# Patient Record
Sex: FEMALE | Race: WHITE | NOT HISPANIC OR LATINO | Employment: FULL TIME | URBAN - METROPOLITAN AREA
[De-identification: names, ages, dates, MRNs, and addresses within clinical notes are randomized per-mention and may not be internally consistent; named-entity substitution may affect disease eponyms.]

---

## 2019-05-23 ENCOUNTER — TELEPHONE (OUTPATIENT)
Dept: OBSTETRICS AND GYNECOLOGY | Facility: HOSPITAL | Age: 34
End: 2019-05-23

## 2019-05-23 ENCOUNTER — HOSPITAL ENCOUNTER (EMERGENCY)
Facility: HOSPITAL | Age: 34
Discharge: HOME | End: 2019-05-23
Attending: EMERGENCY MEDICINE
Payer: MEDICAID

## 2019-05-23 VITALS
SYSTOLIC BLOOD PRESSURE: 114 MMHG | DIASTOLIC BLOOD PRESSURE: 54 MMHG | HEART RATE: 85 BPM | WEIGHT: 280 LBS | TEMPERATURE: 98 F | BODY MASS INDEX: 49.61 KG/M2 | HEIGHT: 63 IN | RESPIRATION RATE: 16 BRPM | OXYGEN SATURATION: 100 %

## 2019-05-23 DIAGNOSIS — Z32.01 POSITIVE PREGNANCY TEST: ICD-10-CM

## 2019-05-23 DIAGNOSIS — N83.202 LEFT OVARIAN CYST: ICD-10-CM

## 2019-05-23 DIAGNOSIS — N23 RENAL COLIC ON LEFT SIDE: Primary | ICD-10-CM

## 2019-05-23 DIAGNOSIS — O36.80X0 PREGNANCY OF UNKNOWN ANATOMIC LOCATION: Primary | ICD-10-CM

## 2019-05-23 DIAGNOSIS — R10.9 LEFT FLANK PAIN: ICD-10-CM

## 2019-05-23 LAB
ABO + RH BLD: NORMAL
ALBUMIN SERPL BCP-MCNC: 3.5 G/DL (ref 3.5–5.2)
ALP SERPL-CCNC: 83 U/L (ref 55–135)
ALT SERPL W/O P-5'-P-CCNC: 19 U/L (ref 10–44)
ANION GAP SERPL CALC-SCNC: 12 MMOL/L (ref 8–16)
AST SERPL-CCNC: 19 U/L (ref 10–40)
B-HCG UR QL: POSITIVE
BACTERIA #/AREA URNS AUTO: ABNORMAL /HPF
BACTERIA #/AREA URNS AUTO: ABNORMAL /HPF
BACTERIA GENITAL QL WET PREP: ABNORMAL
BASOPHILS # BLD AUTO: 0.09 K/UL (ref 0–0.2)
BASOPHILS NFR BLD: 1 % (ref 0–1.9)
BILIRUB DIRECT SERPL-MCNC: 0.3 MG/DL (ref 0.1–0.3)
BILIRUB SERPL-MCNC: 0.4 MG/DL (ref 0.1–1)
BILIRUB UR QL STRIP: NEGATIVE
BILIRUB UR QL STRIP: NEGATIVE
BLD GP AB SCN CELLS X3 SERPL QL: NORMAL
BUN SERPL-MCNC: 14 MG/DL (ref 6–20)
C TRACH DNA SPEC QL NAA+PROBE: NOT DETECTED
CALCIUM SERPL-MCNC: 9.5 MG/DL (ref 8.7–10.5)
CAOX CRY UR QL COMP ASSIST: ABNORMAL
CHLORIDE SERPL-SCNC: 108 MMOL/L (ref 95–110)
CLARITY UR REFRACT.AUTO: ABNORMAL
CLARITY UR REFRACT.AUTO: CLEAR
CLUE CELLS VAG QL WET PREP: ABNORMAL
CO2 SERPL-SCNC: 15 MMOL/L (ref 23–29)
COLOR UR AUTO: ABNORMAL
COLOR UR AUTO: YELLOW
CREAT SERPL-MCNC: 0.9 MG/DL (ref 0.5–1.4)
CTP QC/QA: YES
DIFFERENTIAL METHOD: ABNORMAL
EOSINOPHIL # BLD AUTO: 0.2 K/UL (ref 0–0.5)
EOSINOPHIL NFR BLD: 1.7 % (ref 0–8)
ERYTHROCYTE [DISTWIDTH] IN BLOOD BY AUTOMATED COUNT: 12.2 % (ref 11.5–14.5)
EST. GFR  (AFRICAN AMERICAN): >60 ML/MIN/1.73 M^2
EST. GFR  (NON AFRICAN AMERICAN): >60 ML/MIN/1.73 M^2
FILAMENT FUNGI VAG WET PREP-#/AREA: ABNORMAL
GLUCOSE SERPL-MCNC: 113 MG/DL (ref 70–110)
GLUCOSE UR QL STRIP: NEGATIVE
GLUCOSE UR QL STRIP: NEGATIVE
HCG INTACT+B SERPL-ACNC: 49 MIU/ML
HCT VFR BLD AUTO: 41.2 % (ref 37–48.5)
HGB BLD-MCNC: 13.8 G/DL (ref 12–16)
HGB UR QL STRIP: ABNORMAL
HGB UR QL STRIP: ABNORMAL
HYALINE CASTS UR QL AUTO: 0 /LPF
HYALINE CASTS UR QL AUTO: 5 /LPF
IMM GRANULOCYTES # BLD AUTO: 0.08 K/UL (ref 0–0.04)
IMM GRANULOCYTES NFR BLD AUTO: 0.9 % (ref 0–0.5)
KETONES UR QL STRIP: ABNORMAL
KETONES UR QL STRIP: NEGATIVE
LEUKOCYTE ESTERASE UR QL STRIP: ABNORMAL
LEUKOCYTE ESTERASE UR QL STRIP: NEGATIVE
LYMPHOCYTES # BLD AUTO: 2.6 K/UL (ref 1–4.8)
LYMPHOCYTES NFR BLD: 30.1 % (ref 18–48)
MCH RBC QN AUTO: 27.8 PG (ref 27–31)
MCHC RBC AUTO-ENTMCNC: 33.5 G/DL (ref 32–36)
MCV RBC AUTO: 83 FL (ref 82–98)
MICROSCOPIC COMMENT: ABNORMAL
MICROSCOPIC COMMENT: ABNORMAL
MONOCYTES # BLD AUTO: 0.8 K/UL (ref 0.3–1)
MONOCYTES NFR BLD: 9 % (ref 4–15)
N GONORRHOEA DNA SPEC QL NAA+PROBE: NOT DETECTED
NEUTROPHILS # BLD AUTO: 5 K/UL (ref 1.8–7.7)
NEUTROPHILS NFR BLD: 57.3 % (ref 38–73)
NITRITE UR QL STRIP: NEGATIVE
NITRITE UR QL STRIP: NEGATIVE
NRBC BLD-RTO: 0 /100 WBC
PH UR STRIP: 5 [PH] (ref 5–8)
PH UR STRIP: 6 [PH] (ref 5–8)
PLATELET # BLD AUTO: 291 K/UL (ref 150–350)
PMV BLD AUTO: 8.9 FL (ref 9.2–12.9)
POTASSIUM SERPL-SCNC: 4.6 MMOL/L (ref 3.5–5.1)
PROT SERPL-MCNC: 7.5 G/DL (ref 6–8.4)
PROT UR QL STRIP: ABNORMAL
PROT UR QL STRIP: NEGATIVE
RBC # BLD AUTO: 4.97 M/UL (ref 4–5.4)
RBC #/AREA URNS AUTO: 12 /HPF (ref 0–4)
RBC #/AREA URNS AUTO: 5 /HPF (ref 0–4)
SODIUM SERPL-SCNC: 135 MMOL/L (ref 136–145)
SP GR UR STRIP: 1.01 (ref 1–1.03)
SP GR UR STRIP: >=1.03 (ref 1–1.03)
SPECIMEN SOURCE: ABNORMAL
SQUAMOUS #/AREA URNS AUTO: 3 /HPF
SQUAMOUS #/AREA URNS AUTO: 43 /HPF
T VAGINALIS GENITAL QL WET PREP: ABNORMAL
URN SPEC COLLECT METH UR: ABNORMAL
URN SPEC COLLECT METH UR: ABNORMAL
WBC # BLD AUTO: 8.71 K/UL (ref 3.9–12.7)
WBC #/AREA URNS AUTO: 2 /HPF (ref 0–5)
WBC #/AREA URNS AUTO: 43 /HPF (ref 0–5)
WBC #/AREA VAG WET PREP: ABNORMAL
YEAST GENITAL QL WET PREP: ABNORMAL

## 2019-05-23 PROCEDURE — 87086 URINE CULTURE/COLONY COUNT: CPT

## 2019-05-23 PROCEDURE — 96375 TX/PRO/DX INJ NEW DRUG ADDON: CPT | Mod: 59

## 2019-05-23 PROCEDURE — 99285 PR EMERGENCY DEPT VISIT,LEVEL V: ICD-10-PCS | Mod: ,,, | Performed by: PHYSICIAN ASSISTANT

## 2019-05-23 PROCEDURE — 96376 TX/PRO/DX INJ SAME DRUG ADON: CPT | Mod: 59

## 2019-05-23 PROCEDURE — 86850 RBC ANTIBODY SCREEN: CPT

## 2019-05-23 PROCEDURE — 99285 EMERGENCY DEPT VISIT HI MDM: CPT | Mod: ,,, | Performed by: PHYSICIAN ASSISTANT

## 2019-05-23 PROCEDURE — 87491 CHLMYD TRACH DNA AMP PROBE: CPT

## 2019-05-23 PROCEDURE — 96361 HYDRATE IV INFUSION ADD-ON: CPT | Mod: 59

## 2019-05-23 PROCEDURE — 63600175 PHARM REV CODE 636 W HCPCS: Performed by: EMERGENCY MEDICINE

## 2019-05-23 PROCEDURE — 81025 URINE PREGNANCY TEST: CPT | Performed by: EMERGENCY MEDICINE

## 2019-05-23 PROCEDURE — 25000003 PHARM REV CODE 250: Performed by: PHYSICIAN ASSISTANT

## 2019-05-23 PROCEDURE — 99285 EMERGENCY DEPT VISIT HI MDM: CPT | Mod: 25

## 2019-05-23 PROCEDURE — 80076 HEPATIC FUNCTION PANEL: CPT

## 2019-05-23 PROCEDURE — 80048 BASIC METABOLIC PNL TOTAL CA: CPT

## 2019-05-23 PROCEDURE — 84702 CHORIONIC GONADOTROPIN TEST: CPT

## 2019-05-23 PROCEDURE — 96374 THER/PROPH/DIAG INJ IV PUSH: CPT | Mod: 59

## 2019-05-23 PROCEDURE — 63600175 PHARM REV CODE 636 W HCPCS: Performed by: PHYSICIAN ASSISTANT

## 2019-05-23 PROCEDURE — P9612 CATHETERIZE FOR URINE SPEC: HCPCS

## 2019-05-23 PROCEDURE — 85025 COMPLETE CBC W/AUTO DIFF WBC: CPT

## 2019-05-23 PROCEDURE — 81001 URINALYSIS AUTO W/SCOPE: CPT | Mod: 91

## 2019-05-23 PROCEDURE — 87210 SMEAR WET MOUNT SALINE/INK: CPT

## 2019-05-23 RX ORDER — ONDANSETRON 2 MG/ML
4 INJECTION INTRAMUSCULAR; INTRAVENOUS
Status: COMPLETED | OUTPATIENT
Start: 2019-05-23 | End: 2019-05-23

## 2019-05-23 RX ORDER — HYDROCODONE BITARTRATE AND ACETAMINOPHEN 5; 325 MG/1; MG/1
1 TABLET ORAL EVERY 6 HOURS PRN
Qty: 12 TABLET | Refills: 0 | Status: SHIPPED | OUTPATIENT
Start: 2019-05-23

## 2019-05-23 RX ORDER — MORPHINE SULFATE 4 MG/ML
4 INJECTION, SOLUTION INTRAMUSCULAR; INTRAVENOUS
Status: COMPLETED | OUTPATIENT
Start: 2019-05-23 | End: 2019-05-23

## 2019-05-23 RX ORDER — KETOROLAC TROMETHAMINE 30 MG/ML
10 INJECTION, SOLUTION INTRAMUSCULAR; INTRAVENOUS
Status: DISCONTINUED | OUTPATIENT
Start: 2019-05-23 | End: 2019-05-23

## 2019-05-23 RX ORDER — PYRIDOXINE HCL (VITAMIN B6) 25 MG
25 TABLET ORAL DAILY
Qty: 30 TABLET | Refills: 0 | Status: SHIPPED | OUTPATIENT
Start: 2019-05-23

## 2019-05-23 RX ADMIN — MORPHINE SULFATE 4 MG: 4 INJECTION INTRAVENOUS at 06:05

## 2019-05-23 RX ADMIN — SODIUM CHLORIDE 1000 ML: 0.9 INJECTION, SOLUTION INTRAVENOUS at 06:05

## 2019-05-23 RX ADMIN — ONDANSETRON 4 MG: 2 INJECTION INTRAMUSCULAR; INTRAVENOUS at 10:05

## 2019-05-23 RX ADMIN — MORPHINE SULFATE 4 MG: 4 INJECTION INTRAVENOUS at 05:05

## 2019-05-23 RX ADMIN — ONDANSETRON 4 MG: 2 INJECTION INTRAMUSCULAR; INTRAVENOUS at 05:05

## 2019-05-23 NOTE — ED NOTES
Patient identifiers verified verbally with patient and correct for Dori Peterson.    LOC/ APPEARANCE: The patient is AAOx4. Pt is speaking appropriately, no slurred speech. Pt remains in hospital gown. Continuous and auto BP cuff applied to patient. Pt is clean and well groomed. No JVD visible. Pt reports pain level of 0 at this time. Pt updated on POC. Bed low and locked with side rails up x2, call bell in pt reach.  SKIN: Skin is warm dry and intact, and color is consistent with ethnicity. Capillary refill <3 seconds. No breakdown or brusing visible. Mucus membranes moist, acyanotic.  RESPIRATORY: Airway is open and patent. Respirations-spontaneous, unlabored, regular rate, equal bilaterally on inspiration and expiration. No accessory muscle use noted. Lungs clear to auscultation in all fields bilaterally anterior and posterior.   CARDIAC: Patient has regular heart rate. Patient has no c/o chest pain. Peripheral pulses present equal and strong throughout.  ABDOMEN: Soft, mild tenderness noted to lower abdomen, with no distention noted.  NEUROLOGIC: Eyes open spontaneously and facial expression symmetrical. Pt behavior appropriate to situation, and pt follows commands. Pt reports sensation present in all extremities when touched with a finger, denies any numbness or tingling. PERRLA  MUSCULOSKELETAL: Spontaneous movement noted to all extremities. Hand  equal and leg strength strong +5 bilaterally.

## 2019-05-23 NOTE — ED PROVIDER NOTES
Encounter Date: 5/23/2019    SCRIBE #1 NOTE: IYomi, am scribing for, and in the presence of, Dr. MONY Isaac. Other sections scribed: Attending notes.       History     Chief Complaint   Patient presents with    Flank Pain     pt complaining of left flank pain that radiates to left groin area. pt states she was just diagnosed with bladder infection and UTI     33-year-old female presents to the ER for evaluation of flank pain. Patient reports being diagnosed with a kidney stone 6 weeks ago that she never passed on the left side.  She does not recall the location of the stone or the size of the stone.  Last night the patient was diagnosed with the UTI and started on a penicillin medication.  This was all performed in her hometown, in [facility].  The patient is a new ones on vacation.  Tonight around 3:00 a.m. she had acute onset of severe left-sided flank pain with nausea and vomiting.  She reports the pain being similar to that of when she had kidney stones.  She denies any fevers chills or chest pain. She appears moderately uncomfortable and colicky.         Review of patient's allergies indicates:  No Known Allergies  History reviewed. No pertinent past medical history.  History reviewed. No pertinent surgical history.  History reviewed. No pertinent family history.  Social History     Tobacco Use    Smoking status: Never Smoker   Substance Use Topics    Alcohol use: Not Currently     Frequency: Never    Drug use: Never     Review of Systems   Constitutional: Negative for fever.   HENT: Negative for sore throat.    Respiratory: Negative for shortness of breath.    Cardiovascular: Negative for chest pain.   Gastrointestinal: Positive for abdominal pain, nausea and vomiting.   Genitourinary: Positive for urgency. Negative for dysuria.   Musculoskeletal: Negative for back pain.   Skin: Negative for rash.   Neurological: Negative for weakness.   Hematological: Does not bruise/bleed easily.        Physical Exam     Initial Vitals [05/23/19 0510]   BP Pulse Resp Temp SpO2   130/69 99 20 97.6 °F (36.4 °C) 98 %      MAP       --         Physical Exam    Constitutional: Vital signs are normal. She appears well-developed and well-nourished. She is not diaphoretic. She appears distressed.   HENT:   Head: Normocephalic and atraumatic.   Right Ear: External ear normal.   Left Ear: External ear normal.   Eyes: Conjunctivae are normal.   Cardiovascular: Normal rate and regular rhythm.   Abdominal: Soft. Normal appearance and bowel sounds are normal.   Patient has abdominal pain which she is nontender on exam  No CVA tenderness   Musculoskeletal: Normal range of motion.   Neurological: She is alert and oriented to person, place, and time.   Skin: Skin is warm and intact.   Psychiatric: She has a normal mood and affect. Her speech is normal and behavior is normal. Cognition and memory are normal.         ED Course   Procedures  Labs Reviewed   URINALYSIS, REFLEX TO URINE CULTURE - Abnormal; Notable for the following components:       Result Value    Appearance, UA Cloudy (*)     Specific Gravity, UA >=1.030 (*)     Protein, UA 1+ (*)     Occult Blood UA 2+ (*)     Leukocytes, UA 2+ (*)     All other components within normal limits    Narrative:     Preferred Collection Type->Urine, Clean Catch   CBC W/ AUTO DIFFERENTIAL - Abnormal; Notable for the following components:    MPV 8.9 (*)     Immature Granulocytes 0.9 (*)     Immature Grans (Abs) 0.08 (*)     All other components within normal limits   BASIC METABOLIC PANEL - Abnormal; Notable for the following components:    Sodium 135 (*)     CO2 15 (*)     Glucose 113 (*)     All other components within normal limits   URINALYSIS MICROSCOPIC - Abnormal; Notable for the following components:    RBC, UA 12 (*)     WBC, UA 43 (*)     Bacteria Many (*)     All other components within normal limits    Narrative:     Preferred Collection Type->Urine, Clean Catch    URINALYSIS, REFLEX TO URINE CULTURE - Abnormal; Notable for the following components:    Ketones, UA Trace (*)     Occult Blood UA 2+ (*)     All other components within normal limits    Narrative:     Preferred Collection Type->Urine, Catheterized   URINALYSIS MICROSCOPIC - Abnormal; Notable for the following components:    RBC, UA 5 (*)     Hyaline Casts, UA 5 (*)     All other components within normal limits    Narrative:     Preferred Collection Type->Urine, Catheterized   VAGINAL SCREEN - Abnormal; Notable for the following components:    WBC - Vaginal Screen Moderate (*)     Bacteria - Vaginal Screen Few (*)     All other components within normal limits   POCT URINE PREGNANCY - Abnormal; Notable for the following components:    POC Preg Test, Ur Positive (*)     All other components within normal limits   C. TRACHOMATIS/N. GONORRHOEAE BY AMP DNA    Narrative:     Resulting Location->[facility]   CULTURE, URINE   HCG, QUANTITATIVE, PREGNANCY   HEPATIC FUNCTION PANEL   HEPATIC FUNCTION PANEL    Narrative:     Add on HEPFP;[id number];[id number] Per Dr. Tsang 05/23/2019  06:34    HCG, QUANTITATIVE, PREGNANCY    Narrative:     Add on HEPFP;[id number] HCGQ;[id number] Per Dr. Tsang 05/23/2019  06:34    TYPE & SCREEN     EKG Readings: (Independently Interpreted)   Initial Reading: No STEMI.   NSR, rate 91        Imaging Results          US Retroperitoneal Complete (Final result)  Result time 05/23/19 08:32:37   Procedure changed from US Retroperitoneal Limited     Final result by Karsten Michel MD (05/23/19 08:32:37)                 Impression:      Left-sided hydronephrosis without an obstructive lesion or ureteral stone identified.  Bilateral urine jets visualized.  Consider further evaluation with CT scan.    Electronically signed by resident: Rigo Hastings  Date:    05/23/2019  Time:    08:22    Electronically signed by: Karsten Michel MD  Date:    05/23/2019  Time:    08:32             Narrative:     EXAMINATION:  US RETROPERITONEAL COMPLETE    CLINICAL HISTORY:  lt flank pain; Unspecified abdominal pain    TECHNIQUE:  Ultrasound of the kidneys and urinary bladder was performed including color flow and Doppler evaluation of the kidneys.    COMPARISON:  None.    FINDINGS:  Right kidney: The right kidney measures 11.1 cm. No cortical thinning. No loss of corticomedullary distinction. Resistive index measures 0.61.  No mass. No renal stone. No hydronephrosis.    Left kidney: The left kidney measures 12.8 cm. No cortical thinning. No loss of corticomedullary distinction. Resistive index measures 0.64.  No mass. No renal stone. Mild hydronephrosis.    Bilateral urine jets are seen in the bladder.                               US OB Less Than 14 Wks First Gestation (Final result)  Result time 05/23/19 08:34:20    Final result by Karsten Michel MD (05/23/19 08:34:20)                 Impression:      Pregnancy of unknown location and viability in this patient with a positive urine pregnancy test.  Recommend short-term follow-up and serial beta hCG are recommended to further delineate the left ovarian structure favored to represent a corpus luteum cyst.    Electronically signed by resident: Rigo Hastings  Date:    05/23/2019  Time:    08:08    Electronically signed by: Karsten Michel MD  Date:    05/23/2019  Time:    08:34             Narrative:    EXAMINATION:  US OB LESS THAN 14 WEEKS FIRST GESTATION    CLINICAL HISTORY:  Unspecified abdominal pain    Patient with positive urine pregnancy test.    TECHNIQUE:  Transabdominal sonography of the pelvis was performed, followed by transvaginal sonography to better evaluate the uterus and ovaries.    COMPARISON:  No available dedicated priors.    FINDINGS:  Uterus measures 9.9 x 5.8 x 5.9 cm.    Endometrium measures up to 1.7 cm.    Multiple hypoechoic cystic foci are identified about the cervix likely nabothian cysts..    Gestation(s): No intrauterine or extrauterine  gestation definitely identified.    Right ovary: Normal.  No ectopic pregnancy identified.    Left ovary: Normal.  Poorly defined possibly cystic appearing structure is identified within the left ovary measuring 1.6 x 1.3 x 1.3 cm.  This is indeterminate but may represent a corpus luteum cyst.  Ectopic pregnancy is not definitely excluded, and short-term follow-up is recommended.    Miscellaneous: No Free Fluid.                                 Medical Decision Making:   History:   Old Medical Records: I decided to obtain old medical records.  Initial Assessment:   33-year-old female with flank pain  Differential Diagnosis:   Kidney stone, UTI, pyelonephritis, ectopic prgnancy  Clinical Tests:   Lab Tests: Ordered and Reviewed  Radiological Study: Ordered and Reviewed  ED Management:  32 yo F with flank pain, DDX with kidney stones a few weeks ago, never passed, UTI last night. Concern tonight for infected kidney stone, possible pyelo.   Will get labs, urine, and CT  Pain control, fluids, and reassess.  Pt has a positive UPT. No vaginal complaints  CT cancelled. Will get Retroperitoneal US for eval of possible kidney stone, concern for infected stone and Pelvic US for eval of possible Ectopic  FAST exam negative in the ED  Initial Urinalysis with numerous squamous cells, will get cath specimen  Will sign out pt to 6am ED team    Other:   I discussed test(s) with the performing physician.            Scribe Attestation:   Scribe #1: I performed the above scribed service and the documentation accurately describes the services I performed. I attest to the accuracy of the note.    Attending Attestation:     Physician Attestation Statement for NP/PA:   I have conducted a face to face encounter with this patient in addition to the NP/PA, due to Medical Complexity    Other NP/PA Attestation Additions:    History of Present Illness: 33-year-old  presents complaining of sudden onset of left-sided abdominal pain that  awoke her from sleep around 2:00 a.m..  She reports that 6 weeks ago she was diagnosed with a kidney stone on the left by CT scan.  She had pain for 1 day, strain her urine for 2 weeks.  Has had no pain since and never saw a kidney stone in her urine.  Two nights ago she was seen at an outside emergency department with spasm in her vaginal area.  She felt that it was spasm in her urethra and she was diagnosed with the UTI.  This pain resolved last night prior to going to bed.  Then the pain woke her from sleep in her left abdomen.  I assumed care of this patient from the PA who was caring for her during the night last night.  She had gone to ultrasound at the onset of my shift and currently states her pain is resolved.   Physical Exam: Abdomen is soft with left-sided abdominal tenderness.  Pelvic exam:  There is whitish/yellowish vaginal discharge.  No cervical motion tenderness.  Mild left adnexal tenderness however with bimanual exam patient reports a feeling of bladder spasm.   Medical Decision Making: This is a patient who presents with a positive UPT, beta HCG of 49 and a possible infected stone.  She had 2 ultrasounds done, an ultrasound of her kidneys as well as a pelvic ultrasound.  Ultrasound of her left kidney shows left hydronephrosis with normal ureteral jets.  This is concerning for possible left stone.  Her initial urinalysis showed a contaminated urine specimen so it is unclear if this is an infected stone.  A cath urinalysis is pending.  Her pelvic ultrasound shows no evidence of an IUP however her HCG is very low.  It did however show a cystic structure on her left adnexa.  This could represent a left ovarian cyst however and early ectopic pregnancy cannot be ruled out.  Given that her pain was initially very colicky and severe and now has completely resolved, I suspect a passed kidney stone however I plan to discuss with OBGYN once her cath urinalysis has returned.         Attending ED Notes:   I  discussed the case with dr. David OB staff at [facility] who agrees that this is likely a kidney stone. Given that this is early pregnancy she will need cereal HCG measured. She is currently pain free. I suspect she may have passed a stone into her bladder.             Clinical Impression:       ICD-10-CM ICD-9-CM   1. Renal colic on left side N23 788.0   2. Left flank pain R10.9 789.09   3. Positive pregnancy test Z32.01 V72.42   4. Left ovarian cyst N83.202 620.2         Disposition:   Disposition: Discharged                        Dylon Johnson PA-C  05/23/19 1920       Torres Tsang III, MD  05/25/19 1617

## 2019-05-23 NOTE — TELEPHONE ENCOUNTER
Called patient to inform that order for beta hcg is placed and that she can have it drawn on Saturday, May 25. Unable to leave voicemail.    Maira Mueller MD  OBGYN PGY-1

## 2019-05-23 NOTE — ED TRIAGE NOTES
Dori Peterson, a 33 y.o. female presents to the ED w/ complaint of left lower abdominal pain that radiates to lower back. Pt with PMH kidney stones. Pt states pain feels similar. Denies N/V/D. Pt denies pregnancy.     Triage note:  Chief Complaint   Patient presents with    Flank Pain     pt complaining of left flank pain that radiates to left groin area. pt states she was just diagnosed with bladder infection and UTI     Review of patient's allergies indicates:  No Known Allergies  History reviewed. No pertinent past medical history.     Adult Physical Assessment  LOC: Dori Peterson, 33 y.o. female verified via two identifiers.  The patient is awake, alert, oriented and speaking appropriately at this time.  APPEARANCE: Patient is tearful, moaning, restless, and grimacing. Patient is clean and well groomed, patient's clothing is properly fastened.  SKIN:The skin is warm and dry, color consistent with ethnicity, patient has normal skin turgor and moist mucus membranes, skin intact, no breakdown or brusing noted.  MUSCULOSKELETAL: Patient moving all extremities well, no obvious swelling or deformities noted.  RESPIRATORY: Airway is open and patent, respirations are spontaneous, patient has a normal effort and rate, no accessory muscle use noted.  CARDIAC: Patient has a normal rate and rhythm, no periphreal edema noted in any extremity, capillary refill < 3 seconds in all extremities  ABDOMEN: Soft and no abdominal distention noted. Pt with tenderness to left lower abdomen. Bowel sounds present in all four quadrants.  NEUROLOGIC: Eyes open spontaneously, behavior appropriate to situation, follows commands, facial expression symmetrical, bilateral hand grasp equal and even, purposeful motor response noted, normal sensation in all extremities when touched with a finger.  : pt states she was see recently for UTI. Pt reports burning with urination. Denies blood in urine.

## 2019-05-24 LAB — BACTERIA UR CULT: NO GROWTH

## 2019-05-30 ENCOUNTER — TELEPHONE (OUTPATIENT)
Dept: OBSTETRICS AND GYNECOLOGY | Facility: HOSPITAL | Age: 34
End: 2019-05-30

## 2019-05-30 NOTE — TELEPHONE ENCOUNTER
Called and spoke to Dori Peterson regarding her beta hcg levels. Her last beta hcg level was 49 on 5/23/19. Pt is from out of town, states she has an appointment with her primary OB on 6/6/19. Stressed importance of close follow up. Bleeding and pain precautions given.    Ema Mcqueen MD   OBGYN, PGY-1

## 2022-03-11 NOTE — DISCHARGE INSTRUCTIONS
Our goal in the emergency department is to always give you outstanding care and exceptional service. You may receive a survey by mail or e-mail in the next week regarding your experience in our ED. We would greatly appreciate your completing and returning the survey. Your feedback provides us with a way to recognize our staff who give very good care and it helps us learn how to improve when your experience was below our aspiration of excellence.     Drink plenty of fluids.  Take Tylenol as needed for pain.  When this is not enough take the Norco.  Try to take this in moderation since you are pregnant.  Return to the nearest emergency department for worsening in any way, including worsening pain, vomiting, fevers or any other problems.            11-Mar-2022